# Patient Record
Sex: FEMALE | Race: WHITE
[De-identification: names, ages, dates, MRNs, and addresses within clinical notes are randomized per-mention and may not be internally consistent; named-entity substitution may affect disease eponyms.]

---

## 2017-10-14 ENCOUNTER — HOSPITAL ENCOUNTER (EMERGENCY)
Dept: HOSPITAL 45 - C.EDB | Age: 62
Discharge: HOME | End: 2017-10-14
Payer: COMMERCIAL

## 2017-10-14 VITALS — TEMPERATURE: 98.24 F

## 2017-10-14 VITALS — DIASTOLIC BLOOD PRESSURE: 79 MMHG | OXYGEN SATURATION: 97 % | HEART RATE: 77 BPM | SYSTOLIC BLOOD PRESSURE: 143 MMHG

## 2017-10-14 VITALS
WEIGHT: 241.41 LBS | BODY MASS INDEX: 43.86 KG/M2 | HEIGHT: 62.01 IN | BODY MASS INDEX: 43.86 KG/M2 | WEIGHT: 241.41 LBS | HEIGHT: 62.01 IN

## 2017-10-14 DIAGNOSIS — I25.10: ICD-10-CM

## 2017-10-14 DIAGNOSIS — Z79.82: ICD-10-CM

## 2017-10-14 DIAGNOSIS — R56.9: ICD-10-CM

## 2017-10-14 DIAGNOSIS — E78.5: ICD-10-CM

## 2017-10-14 DIAGNOSIS — R10.9: Primary | ICD-10-CM

## 2017-10-14 DIAGNOSIS — R91.1: ICD-10-CM

## 2017-10-14 DIAGNOSIS — E55.9: ICD-10-CM

## 2017-10-14 DIAGNOSIS — F32.9: ICD-10-CM

## 2017-10-14 DIAGNOSIS — K21.9: ICD-10-CM

## 2017-10-14 LAB
ALP SERPL-CCNC: 105 U/L (ref 45–117)
ALT SERPL-CCNC: 36 U/L (ref 12–78)
ANION GAP SERPL CALC-SCNC: 3 MMOL/L (ref 3–11)
APPEARANCE UR: CLEAR
AST SERPL-CCNC: 32 U/L (ref 15–37)
BASOPHILS # BLD: 0.01 K/UL (ref 0–0.2)
BASOPHILS NFR BLD: 0.1 %
BILIRUB UR-MCNC: (no result) MG/DL
BUN SERPL-MCNC: 14 MG/DL (ref 7–18)
BUN/CREAT SERPL: 14.4 (ref 10–20)
CALCIUM SERPL-MCNC: 8.8 MG/DL (ref 8.5–10.1)
CHLORIDE SERPL-SCNC: 106 MMOL/L (ref 98–107)
CO2 SERPL-SCNC: 30 MMOL/L (ref 21–32)
COLOR UR: YELLOW
COMPLETE: YES
CREAT CL PREDICTED SERPL C-G-VRATE: 71.6 ML/MIN
CREAT SERPL-MCNC: 0.95 MG/DL (ref 0.6–1.2)
EOSINOPHIL NFR BLD AUTO: 231 K/UL (ref 130–400)
GLUCOSE SERPL-MCNC: 87 MG/DL (ref 70–99)
HCT VFR BLD CALC: 40.1 % (ref 37–47)
IG%: 0.4 %
IMM GRANULOCYTES NFR BLD AUTO: 28.5 %
LYMPHOCYTES # BLD: 2.04 K/UL (ref 1.2–3.4)
MANUAL MICROSCOPIC REQUIRED?: NO
MCH RBC QN AUTO: 31.4 PG (ref 25–34)
MCHC RBC AUTO-ENTMCNC: 32.9 G/DL (ref 32–36)
MCV RBC AUTO: 95.5 FL (ref 80–100)
MONOCYTES NFR BLD: 7.8 %
NEUTROPHILS # BLD AUTO: 0.4 %
NEUTROPHILS NFR BLD AUTO: 62.8 %
NITRITE UR QL STRIP: (no result)
PH UR STRIP: 6.5 [PH] (ref 4.5–7.5)
PMV BLD AUTO: 9.6 FL (ref 7.4–10.4)
POTASSIUM SERPL-SCNC: 4.4 MMOL/L (ref 3.5–5.1)
RBC # BLD AUTO: 4.2 M/UL (ref 4.2–5.4)
REVIEW REQ?: NO
SODIUM SERPL-SCNC: 139 MMOL/L (ref 136–145)
SP GR UR STRIP: 1.01 (ref 1–1.03)
URINE EPITHELIAL CELL AUTO: (no result) /LPF (ref 0–5)
UROBILINOGEN UR-MCNC: (no result) MG/DL
WBC # BLD AUTO: 7.15 K/UL (ref 4.8–10.8)
ZZUR CULT IF INDIC CLEAN CATCH: NO

## 2017-10-14 NOTE — DIAGNOSTIC IMAGING REPORT
CT SCAN OF THE LUMBAR SPINE WITHOUT IV CONTRAST



CLINICAL HISTORY: Low back pain.



COMPARISON STUDY: CT scan of the lumbar spine dated 03/01/2015.



TECHNIQUE: CT scan of the lumbar spine is performed from the lower thoracic

spine to the sacrum. Images reviewed in the axial, sagittal, and coronal planes.

IV contrast was not administered for this examination. A dose lowering technique

was utilized adhering to the principles of ALARA.



FINDINGS: The skeletal structures are osteopenic. There is no evidence of acute

fracture or malalignment involving the lumbar spine. A mild chronic compression

deformity of T12 is unchanged from 2015. Vertebral body height and alignment are

maintained throughout the lumbar spine. The transverse and spinous processes

appear intact. There is no evidence of spondylolysis. Mild hyperlordosis is

observed. No lytic or blastic lesion is seen. No significant neural foraminal

stenosis is identified throughout the lumbar spine. The intervertebral disc

spaces are preserved. A small posterior disc bulge is noted at L5-S1. There is

no evidence of large disc herniation or significant acquired compromise of the

central canal. The visualized sacrum and bony pelvis appear intact. Mild

degenerative change and vacuum phenomenon are noted in the sacroiliac joints.

The paraspinous soft tissues are within normal limits. The abdominal aorta is

normal in caliber noting mild atherosclerotic calcification. Colonic

diverticulosis is partially imaged.



IMPRESSION:



1. There is no acute bony abnormality seen involving the lumbar spine.



2. A mild chronic compression deformity of T12 is unchanged from 2015.



3. Osteopenia and minimal degenerative change as above.









Dictated:  10/14/2017 9:46 PM

Transcribed:  10/14/2017 10:08 PM

[facility]







Electronically signed by:  Danny Eckert M.D.

10/14/2017 10:17 PM



Dictated Date/Time:  10/14/2017 9:46 PM

## 2017-10-14 NOTE — DIAGNOSTIC IMAGING REPORT
CT SCAN OF THE ABDOMEN AND PELVIS WITH IV CONTRAST



CLINICAL HISTORY:   Left-sided abdominal pain.



COMPARISON STUDY:  Abdominal CT dated 3/1/2015 and 4/7/2013.



TECHNIQUE: Following the IV administration of  93 cc of Optiray 320, CT scan of

the abdomen and pelvis is performed from the lung bases to the proximal femora.

Images are reviewed in the axial, sagittal, and coronal planes. IV contrast was

administered without complication. A dose lowering technique was utilized

adhering to the principles of ALARA. 



CT DOSE: 3249.30 mGy.cm



FINDINGS:



Lung bases: The heart is normal in size and without pericardial effusion. 5 mm

and 6 cm nodules are identified in the lingula on images #32 and #37. These are

of doubtful significance and have been present dating back to 2013. The lung

bases are otherwise clear. There is a small hiatal hernia.



Liver: The contrast-enhanced liver is mildly enlarged measuring 18.3 cm in

length. The liver demonstrates diffusely diminished attenuation consistent with

hepatic steatosis. There is minimal central intrahepatic biliary ductal

dilatation. The hepatic veins and portal veins are patent.



Gallbladder: Surgically absent noting clips in the gallbladder fossa.



Spleen: Normal in size and attenuation.



Pancreas: Unremarkable.



Adrenal glands: Unremarkable.



Kidneys: The contrast enhanced kidneys demonstrate mild cortical atrophy and are

without hydronephrosis. The kidneys enhance symmetrically.



Abdominal vasculature: The abdominal aorta is normal in course and caliber

noting mild atherosclerotic calcification.



Bowel: Findings suggest previous sigmoid colon resection with colocolonic

anastomosis. No bowel obstruction is seen. There is mild to moderate

diverticulosis of the left colon without CT evidence of acute diverticulitis.

The appendix is  well-visualized and normal.



Peritoneum: There is no intraperitoneal free air or abdominal ascites.



Lymphadenopathy: None.



Pelvic viscera: The bladder is normal as visualized. The uterus is surgically

absent. No adnexal lesion is seen.



Skeletal structures: The skeletal structures are osteopenic. There is a mild

chronic compression deformity of T12. No lytic or blastic lesions are seen.

There are healed left-sided rib fractures.





IMPRESSION:



1. There are no acute infectious or inflammatory findings in the abdomen or

pelvis.



2. Findings suggest previous sigmoid colon resection with colocolonic

anastomosis. There is no bowel obstruction.



3. There is mild to moderate diverticulosis of the remaining left colon without

CT evidence of acute diverticulitis.



4. Hepatomegaly and hepatic steatosis.



5. Additional findings as above.







Electronically signed by:  Danny Eckert M.D.

10/14/2017 9:58 PM



Dictated Date/Time:  10/14/2017 9:50 PM

## 2017-10-15 NOTE — EMERGENCY ROOM VISIT NOTE
History


Report prepared by Carli:  Farhan Blackburn


Under the Supervision of:  Dr. Fransisco Peña D.O.


First contact with patient:  19:15


Chief Complaint:  FLANK PAIN


Stated Complaint:  ABDOMINAL PAIN





History of Present Illness


The patient is a 62 year old female who presents to the Emergency Room with 

complaints of worsening left flank pain beginning a week ago. The patient 

reports she went to bend over and heard a crack in her back. She notes her 

discomfort has been there since and nothing makes it feel better or worse. The 

patient states she also had part of her bowel removed 18 years ago, and she has 

had discomfort since. She reports over the past week, it has been worsening 

though. The patient notes she thought it would go away, but it did not. She 

states she is also experiencing lower back pain that radiates to her hip. The 

patient reports she has a history of degenerative disk disease, arthritis, and 

osteoporosis in her back. She notes she is able to pass gas and go to the 

restroom. The patient denies pain with urination, blood with urination, 

numbness to groin, and numbness to legs. She states she has a history of a 

hysterectomy, appendectomy, and cholecystectomy.





   Source of History:  patient


   Onset:  week ago


   Position:  other (Left flank)


   Timing:  worsening


   Associated Symptoms:  + back pain (lower back pain that radiates to the hip)

, No urinary symptoms, No numbness





Review of Systems


See HPI for pertinent positives & negatives. A total of 10 systems reviewed and 

were otherwise negative.





Past Medical & Surgical


Medical Problems:


(1) Coronary artery disease


(2) Depression


(3) Diverticulosis


(4) Dyslipidemia


(5) Epileptic seizure


(6) Gastroesophageal reflux disease


(7) Gastroparesis


(8) Non-alcoholic fatty liver


(9) Osteoporosis


(10) Pulmonary nodules


(11) Renal mass


(12) Vitamin D deficiency


Surgical Problems:


(1) H/O oophorectomy


(2) H/O umbilical hernia repair


(3) Hx of cholecystectomy


(4) s/p partial colectomy


(5) s/p PANCHITO


(6) s/p tubal ligation








Family History





No pertinent family history





Social History


Smoking Status:  Never Smoker


Alcohol Use:  occasionally


Housing Status:  lives with family


Occupation Status:  employed





Current/Historical Medications


Scheduled


Aspirin Enteric Coated (Ecotrin Or Generic), 81 MG PO DAILY


Atorvastatin (Lipitor), 40 MG PO DAILY


Celecoxib (Celecoxib), 200 MG PO DAILY


Cholecalciferol (Vitamin D), 1 TAB PO WK


Omeprazole (Prilosec), 20 MG PO BID


Venlafaxine Hcl (Effexor Extended Rel), 3 CAP PO DAILY


Zoledronic Acid (Reclast), 1 DOSE IV UD





Allergies


Coded Allergies:  


     Sulfa Drugs (Unverified  Allergy, Severe, RASH, 4/6/13)





Physical Exam


Vital Signs











  Date Time  Temp Pulse Resp B/P (MAP) Pulse Ox O2 Delivery O2 Flow Rate FiO2


 


10/14/17 22:54  77 16 143/79 97   


 


10/14/17 21:41  81 16 143/79 99 Room Air  


 


10/14/17 19:47  81 18 146/87 95 Room Air  


 


10/14/17 19:12 36.8 85 20 163/98 97 Room Air  











Physical Exam


GENERAL:Sitting up in bed, holding left upper quadrant, no distress, non-toxic 


EYE EXAM: normal conjunctiva, PERRL and EOM's grossly intact


OROPHARYNX: no exudate, no erythema, lips, buccal mucosa, and tongue normal and 

mucous membranes are moist


NECK: supple, no nuchal rigidity, no adenopathy, non-tender


LUNGS: Clear to auscultation. Normal chest wall mechanics


HEART: no murmurs, S1 normal and S2 normal 


ABDOMEN: abdomen soft, non-tender, normo-active bowel sounds, no masses, no 

rebound or guarding. 


BACK: Back is symmetrical on inspection and there is no deformity, minimal 

tenderness to the S1 joint tracking to right gluteus.


SKIN: no rashes and no bruising 


UPPER EXTREMITIES: upper extremities are grossly normal. 


LOWER EXTREMITIES: No pitting edema. Legs: Flexion extension of the hip, knee, 

ankle, and EHL 5/5, gross sensation intact.


NEURO EXAM: Normal sensorium, cranial nerves II-XII grossly intact, normal 

speech,  no gross weakness of arms, no gross weakness of legs.





Medical Decision & Procedures


ER Provider


Diagnostic Interpretation:


CT:Per my review, radiologist interpretation.





CT SCAN OF THE LUMBAR SPINE WITHOUT IV CONTRAST





CLINICAL HISTORY: Low back pain.





COMPARISON STUDY: CT scan of the lumbar spine dated 03/01/2015.





TECHNIQUE: CT scan of the lumbar spine is performed from the lower thoracic


spine to the sacrum. Images reviewed in the axial, sagittal, and coronal planes.


IV contrast was not administered for this examination. A dose lowering technique


was utilized adhering to the principles of ALARA.





FINDINGS: The skeletal structures are osteopenic. There is no evidence of acute


fracture or malalignment involving the lumbar spine. A mild chronic compression


deformity of T12 is unchanged from 2015. Vertebral body height and alignment are


maintained throughout the lumbar spine. The transverse and spinous processes


appear intact. There is no evidence of spondylolysis. Mild hyperlordosis is


observed. No lytic or blastic lesion is seen. No significant neural foraminal


stenosis is identified throughout the lumbar spine. The intervertebral disc


spaces are preserved. A small posterior disc bulge is noted at L5-S1. There is


no evidence of large disc herniation or significant acquired compromise of the


central canal. The visualized sacrum and bony pelvis appear intact. Mild


degenerative change and vacuum phenomenon are noted in the sacroiliac joints.


The paraspinous soft tissues are within normal limits. The abdominal aorta is


normal in caliber noting mild atherosclerotic calcification. Colonic


diverticulosis is partially imaged.





IMPRESSION:





1. There is no acute bony abnormality seen involving the lumbar spine.





2. A mild chronic compression deformity of T12 is unchanged from 2015.





3. Osteopenia and minimal degenerative change as above.














Dictated:  10/14/2017 9:46 PM


Transcribed:  10/14/2017 10:08 PM


[email or web address]_Emma





Electronically signed by:  Danny Eckert M.D.


10/14/2017 10:17 PM





Dictated Date/Time:  10/14/2017 9:46 PM





CT SCAN OF THE ABDOMEN AND PELVIS WITH IV CONTRAST





CLINICAL HISTORY:   Left-sided abdominal pain.





COMPARISON STUDY:  Abdominal CT dated 3/1/2015 and 4/7/2013.





TECHNIQUE: Following the IV administration of  93 cc of Optiray 320, CT scan of


the abdomen and pelvis is performed from the lung bases to the proximal femora.


Images are reviewed in the axial, sagittal, and coronal planes. IV contrast was


administered without complication. A dose lowering technique was utilized


adhering to the principles of ALARA. 





CT DOSE: 3249.30 mGy.cm





FINDINGS:





Lung bases: The heart is normal in size and without pericardial effusion. 5 mm


and 6 cm nodules are identified in the lingula on images #32 and #37. These are


of doubtful significance and have been present dating back to 2013. The lung


bases are otherwise clear. There is a small hiatal hernia.





Liver: The contrast-enhanced liver is mildly enlarged measuring 18.3 cm in


length. The liver demonstrates diffusely diminished attenuation consistent with


hepatic steatosis. There is minimal central intrahepatic biliary ductal


dilatation. The hepatic veins and portal veins are patent.





Gallbladder: Surgically absent noting clips in the gallbladder fossa.





Spleen: Normal in size and attenuation.





Pancreas: Unremarkable.





Adrenal glands: Unremarkable.





Kidneys: The contrast enhanced kidneys demonstrate mild cortical atrophy and are


without hydronephrosis. The kidneys enhance symmetrically.





Abdominal vasculature: The abdominal aorta is normal in course and caliber


noting mild atherosclerotic calcification.





Bowel: Findings suggest previous sigmoid colon resection with colocolonic


anastomosis. No bowel obstruction is seen. There is mild to moderate


diverticulosis of the left colon without CT evidence of acute diverticulitis.


The appendix is  well-visualized and normal.





Peritoneum: There is no intraperitoneal free air or abdominal ascites.





Lymphadenopathy: None.





Pelvic viscera: The bladder is normal as visualized. The uterus is surgically


absent. No adnexal lesion is seen.





Skeletal structures: The skeletal structures are osteopenic. There is a mild


chronic compression deformity of T12. No lytic or blastic lesions are seen.


There are healed left-sided rib fractures.








IMPRESSION:





1. There are no acute infectious or inflammatory findings in the abdomen or


pelvis.





2. Findings suggest previous sigmoid colon resection with colocolonic


anastomosis. There is no bowel obstruction.





3. There is mild to moderate diverticulosis of the remaining left colon without


CT evidence of acute diverticulitis.





4. Hepatomegaly and hepatic steatosis.





5. Additional findings as above.





Electronically signed by:  Danny Eckert M.D.


10/14/2017 9:58 PM





Dictated Date/Time:  10/14/2017 9:50 PM





Laboratory Results


10/14/17 20:08








Red Blood Count 4.20, Mean Corpuscular Volume 95.5, Mean Corpuscular Hemoglobin 

31.4, Mean Corpuscular Hemoglobin Concent 32.9, Mean Platelet Volume 9.6, 

Neutrophils (%) (Auto) 62.8, Lymphocytes (%) (Auto) 28.5, Monocytes (%) (Auto) 

7.8, Eosinophils (%) (Auto) 0.4, Basophils (%) (Auto) 0.1, Neutrophils # (Auto) 

4.48, Lymphocytes # (Auto) 2.04, Monocytes # (Auto) 0.56, Eosinophils # (Auto) 

0.03, Basophils # (Auto) 0.01





10/14/17 20:08

















Test


  10/14/17


20:08 10/14/17


20:50


 


White Blood Count


  7.15 K/uL


(4.8-10.8) 


 


 


Red Blood Count


  4.20 M/uL


(4.2-5.4) 


 


 


Hemoglobin


  13.2 g/dL


(12.0-16.0) 


 


 


Hematocrit 40.1 % (37-47)  


 


Mean Corpuscular Volume


  95.5 fL


() 


 


 


Mean Corpuscular Hemoglobin


  31.4 pg


(25-34) 


 


 


Mean Corpuscular Hemoglobin


Concent 32.9 g/dl


(32-36) 


 


 


Platelet Count


  231 K/uL


(130-400) 


 


 


Mean Platelet Volume


  9.6 fL


(7.4-10.4) 


 


 


Neutrophils (%) (Auto) 62.8 %  


 


Lymphocytes (%) (Auto) 28.5 %  


 


Monocytes (%) (Auto) 7.8 %  


 


Eosinophils (%) (Auto) 0.4 %  


 


Basophils (%) (Auto) 0.1 %  


 


Neutrophils # (Auto)


  4.48 K/uL


(1.4-6.5) 


 


 


Lymphocytes # (Auto)


  2.04 K/uL


(1.2-3.4) 


 


 


Monocytes # (Auto)


  0.56 K/uL


(0.11-0.59) 


 


 


Eosinophils # (Auto)


  0.03 K/uL


(0-0.5) 


 


 


Basophils # (Auto)


  0.01 K/uL


(0-0.2) 


 


 


RDW Standard Deviation


  43.2 fL


(36.4-46.3) 


 


 


RDW Coefficient of Variation


  12.4 %


(11.5-14.5) 


 


 


Immature Granulocyte % (Auto) 0.4 %  


 


Immature Granulocyte # (Auto)


  0.03 K/uL


(0.00-0.02) 


 


 


Anion Gap


  3.0 mmol/L


(3-11) 


 


 


Est Creatinine Clear Calc


Drug Dose 71.6 ml/min 


  


 


 


Estimated GFR (


American) 74.4 


  


 


 


Estimated GFR (Non-


American 64.2 


  


 


 


BUN/Creatinine Ratio 14.4 (10-20)  


 


Calcium Level


  8.8 mg/dl


(8.5-10.1) 


 


 


Total Bilirubin


  0.5 mg/dl


(0.2-1) 


 


 


Direct Bilirubin  mg/dl (0-0.2)  


 


Aspartate Amino Transf


(AST/SGOT) 32 U/L (15-37) 


  


 


 


Alanine Aminotransferase


(ALT/SGPT) 36 U/L (12-78) 


  


 


 


Alkaline Phosphatase


  105 U/L


() 


 


 


Total Protein


  7.4 gm/dl


(6.4-8.2) 


 


 


Albumin


  3.7 gm/dl


(3.4-5.0) 


 


 


Lipase


  193 U/L


() 


 


 


Urine Color  YELLOW 


 


Urine Appearance  CLEAR (CLEAR) 


 


Urine pH  6.5 (4.5-7.5) 


 


Urine Specific Gravity


  


  1.012


(1.000-1.030)


 


Urine Protein  NEG (NEG) 


 


Urine Glucose (UA)  NEG (NEG) 


 


Urine Ketones  NEG (NEG) 


 


Urine Occult Blood  NEG (NEG) 


 


Urine Nitrite  NEG (NEG) 


 


Urine Bilirubin  NEG (NEG) 


 


Urine Urobilinogen  NEG (NEG) 


 


Urine Leukocyte Esterase  NEG (NEG) 


 


Urine WBC (Auto)  1-5 /hpf (0-5) 


 


Urine RBC (Auto)  0-4 /hpf (0-4) 


 


Urine Hyaline Casts (Auto)  0 /lpf (0-5) 


 


Urine Epithelial Cells (Auto)  0-5 /lpf (0-5) 


 


Urine Bacteria (Auto)  NEG (NEG) 





Laboratory results per my review.





Medications Administered











 Medications


  (Trade)  Dose


 Ordered  Sig/Billy


 Route  Start Time


 Stop Time Status Last Admin


Dose Admin


 


 Sodium Chloride  500 ml @ 


 999 mls/hr  Q31M STAT


 IV  10/14/17 19:48


 10/14/17 20:18 DC 10/14/17 20:28


999 MLS/HR


 


 Ondansetron HCl


  (Zofran Inj)  4 mg  NOW  STAT


 IV  10/14/17 19:48


 10/14/17 19:50 DC 10/14/17 20:28


4 MG











ED Course


ED COURSE: 


Vital signs were reviewed and showed hypertension.


The patients medical record was reviewed


The above diagnostic studies were performed and reviewed.


ED treatments and interventions as stated above. 





1926: The patient was evaluated in room C08. A complete history and physical 

examination was performed.





1948: Ordered Ondansetron HCl 4mg IV, Sodium Chloride 500 ml @ 999 mls/hr IV





2222: Upon reevaluation, the patient is resting comfortably. I discussed my 

findings with the patient and she understands and agrees with the treatment 

plan.   


Based on the patients age, coexisting illnesses, exam and lab findings the 

decision to treat as an outpatient was made.


The patient remained stable while under my care.


The patient appeared well at the time of discharge.





Medical Decision


Differential diagnoses includes but is not limited to gastritis, peptic ulcer 

disease, GERD, gallbladder disease, pancreatitis, small bowel obstruction, 

acute coronary syndrome, pericarditis, ischemic bowel, irritable bowel disease, 

irritable bowel syndrome, appendicitis, diverticulitis, malignancy, hernia, 

urinary tract infection, torsion, pregnancy/ectopic pregnancy (if female), 

perforation, trauma, infectious. 





Patient is a 62-year-old female that presents to ER for left flank pain which 

is been present since 1999 and worsening over the past 2 weeks.  She also 

complains of right lower back pain which is radiating down the right leg which 

is worsening over the past week.  No weakness or numbness in arms or legs.  No 

saddle paresthesias.  History of cholecystectomy, appendectomy and complete 

hysterectomy.  Normal bowel movement in past 24 hours.  Abdominal exam is 

benign.  Completely neurologically intact.  Able to ambulate without 

difficulty.  No signs cauda equina.  Vitals are unremarkable.  CBC all BMP, LFTs

, bilirubin and lipase is negative.  UA clean.  CT abdomen and pelvis and 

lumbar spine show no acute pathology.  Patient was updated at bedside.  She was 

discharged follow-up with PCP as I favor this is likely musculoskeletal pain. 

Discussed with Pt concerning signs and symptoms to watch out for. Pt was 

instructed to follow up with their PCP and discussed with the patient their 

option to return to the ED at anytime for persistent or worsening symptoms. The 

appropriate anticipatory guidance and out-patient management, including 

indications for return to the emergency department, were explained at length to 

the patient and understood.





Medication Reconcilliation


Current Medication List:  was personally reviewed by me





Blood Pressure Screening


Patient's blood pressure:  Elevated blood pressure


Blood pressure disposition:  Referred to PCP





Impression





 Primary Impression:  


 Abdominal pain





Scribe Attestation


The scribe's documentation has been prepared under my direction and personally 

reviewed by me in its entirety. I confirm that the note above accurately 

reflects all work, treatment, procedures, and medical decision making performed 

by me.





Departure Information


Dispostion


Home / Self-Care





Referrals


Crystal Lemus MD (PCP)





Forms


HOME CARE DOCUMENTATION FORM,                                                 

               IMPORTANT VISIT INFORMATION





Patient Instructions


Abdominal Pain - [facility], [facility]





Additional Instructions





Please follow up with your primary care doctor with in the next 24 hours.  Any 

worsening of your symptoms, please return to the ED immediately. This includes 

any fevers greater than 100.4, worsening pain, chest pain, shortness breath, 

persistent nausea, vomiting, unable to eat or drink, unable to walk, tingling 

or numbness in legs, or any other concerning signs or symptoms from your 

standpoint.








Please take Tylenol or Motrin as needed for pain.





Problem Qualifiers








 Primary Impression:  


 Abdominal pain


 Abdominal location:  unspecified location  Qualified Codes:  R10.9 - 

Unspecified abdominal pain

## 2018-02-15 ENCOUNTER — HOSPITAL ENCOUNTER (OUTPATIENT)
Dept: HOSPITAL 45 - C.LABPBG | Age: 63
Discharge: HOME | End: 2018-02-15
Attending: FAMILY MEDICINE
Payer: COMMERCIAL

## 2018-02-15 DIAGNOSIS — E78.00: ICD-10-CM

## 2018-02-15 DIAGNOSIS — I25.10: ICD-10-CM

## 2018-02-15 DIAGNOSIS — Z00.00: Primary | ICD-10-CM

## 2018-02-15 DIAGNOSIS — R53.83: ICD-10-CM

## 2018-02-15 LAB
ALBUMIN SERPL-MCNC: 3.6 GM/DL (ref 3.4–5)
ALP SERPL-CCNC: 104 U/L (ref 45–117)
ALT SERPL-CCNC: 39 U/L (ref 12–78)
AST SERPL-CCNC: 29 U/L (ref 15–37)
BUN SERPL-MCNC: 16 MG/DL (ref 7–18)
CALCIUM SERPL-MCNC: 9.3 MG/DL (ref 8.5–10.1)
CO2 SERPL-SCNC: 24 MMOL/L (ref 21–32)
CREAT SERPL-MCNC: 0.98 MG/DL (ref 0.6–1.2)
GLUCOSE SERPL-MCNC: 114 MG/DL (ref 70–99)
HBA1C MFR BLD: 5.6 % (ref 4.5–5.6)
KETONES UR QL STRIP: 71 MG/DL
PH UR: 147 MG/DL (ref 0–200)
POTASSIUM SERPL-SCNC: 4.2 MMOL/L (ref 3.5–5.1)
PROT SERPL-MCNC: 7.2 GM/DL (ref 6.4–8.2)
SODIUM SERPL-SCNC: 137 MMOL/L (ref 136–145)